# Patient Record
Sex: FEMALE | Race: WHITE
[De-identification: names, ages, dates, MRNs, and addresses within clinical notes are randomized per-mention and may not be internally consistent; named-entity substitution may affect disease eponyms.]

---

## 2017-07-05 ENCOUNTER — HOSPITAL ENCOUNTER (OUTPATIENT)
Dept: HOSPITAL 17 - NEPC | Age: 42
Setting detail: OBSERVATION
LOS: 1 days | Discharge: HOME | End: 2017-07-06
Attending: INTERNAL MEDICINE | Admitting: INTERNAL MEDICINE
Payer: COMMERCIAL

## 2017-07-05 VITALS — WEIGHT: 194.01 LBS | BODY MASS INDEX: 32.32 KG/M2 | HEIGHT: 65 IN

## 2017-07-05 VITALS
DIASTOLIC BLOOD PRESSURE: 72 MMHG | RESPIRATION RATE: 16 BRPM | OXYGEN SATURATION: 97 % | HEART RATE: 77 BPM | SYSTOLIC BLOOD PRESSURE: 114 MMHG

## 2017-07-05 VITALS
RESPIRATION RATE: 16 BRPM | DIASTOLIC BLOOD PRESSURE: 89 MMHG | TEMPERATURE: 98.6 F | OXYGEN SATURATION: 98 % | HEART RATE: 112 BPM | SYSTOLIC BLOOD PRESSURE: 163 MMHG

## 2017-07-05 VITALS — OXYGEN SATURATION: 97 %

## 2017-07-05 DIAGNOSIS — R07.89: Primary | ICD-10-CM

## 2017-07-05 DIAGNOSIS — M94.0: ICD-10-CM

## 2017-07-05 LAB
ANION GAP SERPL CALC-SCNC: 6 MEQ/L (ref 5–15)
BASOPHILS # BLD AUTO: 0.1 TH/MM3 (ref 0–0.2)
BASOPHILS NFR BLD: 0.6 % (ref 0–2)
BUN SERPL-MCNC: 13 MG/DL (ref 7–18)
CHLORIDE SERPL-SCNC: 105 MEQ/L (ref 98–107)
CK SERPL-CCNC: 58 U/L (ref 26–192)
EOSINOPHIL # BLD: 0.1 TH/MM3 (ref 0–0.4)
EOSINOPHIL NFR BLD: 1 % (ref 0–4)
ERYTHROCYTE [DISTWIDTH] IN BLOOD BY AUTOMATED COUNT: 12.7 % (ref 11.6–17.2)
GFR SERPLBLD BASED ON 1.73 SQ M-ARVRAT: 90 ML/MIN (ref 89–?)
HCO3 BLD-SCNC: 27.1 MEQ/L (ref 21–32)
HCT VFR BLD CALC: 36.4 % (ref 35–46)
HEMO FLAGS: (no result)
LYMPHOCYTES # BLD AUTO: 2 TH/MM3 (ref 1–4.8)
LYMPHOCYTES NFR BLD AUTO: 24.4 % (ref 9–44)
MCH RBC QN AUTO: 29.7 PG (ref 27–34)
MCHC RBC AUTO-ENTMCNC: 33.8 % (ref 32–36)
MCV RBC AUTO: 87.7 FL (ref 80–100)
MONOCYTES NFR BLD: 7.6 % (ref 0–8)
NEUTROPHILS # BLD AUTO: 5.5 TH/MM3 (ref 1.8–7.7)
NEUTROPHILS NFR BLD AUTO: 66.4 % (ref 16–70)
PLATELET # BLD: 239 TH/MM3 (ref 150–450)
POTASSIUM SERPL-SCNC: 4 MEQ/L (ref 3.5–5.1)
RBC # BLD AUTO: 4.15 MIL/MM3 (ref 4–5.3)
SODIUM SERPL-SCNC: 138 MEQ/L (ref 136–145)
WBC # BLD AUTO: 8.3 TH/MM3 (ref 4–11)

## 2017-07-05 PROCEDURE — G0378 HOSPITAL OBSERVATION PER HR: HCPCS

## 2017-07-05 PROCEDURE — 80048 BASIC METABOLIC PNL TOTAL CA: CPT

## 2017-07-05 PROCEDURE — 96375 TX/PRO/DX INJ NEW DRUG ADDON: CPT

## 2017-07-05 PROCEDURE — 99285 EMERGENCY DEPT VISIT HI MDM: CPT

## 2017-07-05 PROCEDURE — 96374 THER/PROPH/DIAG INJ IV PUSH: CPT

## 2017-07-05 PROCEDURE — 82550 ASSAY OF CK (CPK): CPT

## 2017-07-05 PROCEDURE — 71275 CT ANGIOGRAPHY CHEST: CPT

## 2017-07-05 PROCEDURE — 93005 ELECTROCARDIOGRAM TRACING: CPT

## 2017-07-05 PROCEDURE — 84703 CHORIONIC GONADOTROPIN ASSAY: CPT

## 2017-07-05 PROCEDURE — 84484 ASSAY OF TROPONIN QUANT: CPT

## 2017-07-05 PROCEDURE — 71010: CPT

## 2017-07-05 PROCEDURE — 85025 COMPLETE CBC W/AUTO DIFF WBC: CPT

## 2017-07-05 PROCEDURE — 93017 CV STRESS TEST TRACING ONLY: CPT

## 2017-07-05 PROCEDURE — C9113 INJ PANTOPRAZOLE SODIUM, VIA: HCPCS

## 2017-07-05 NOTE — PD
HPI


Chief Complaint:  Chest Pain


Time Seen by Provider:  23:34


Travel History


International Travel<30 days:  No


Contact w/Intl Traveler<30days:  No


Traveled to known affect area:  No





History of Present Illness


HPI


This is a 41-year-old female with nonicteric number control and has no other 

cardiovascular risk factors and has a history of GERD presents with a complaint 

of midsternal chest pain.  He states this feels different from her pain as she 

is experienced with GERD in the past.  She complains of associated 

lightheadedness along with the chest pain and the chest pain is noted to be 

intermittent and it started while she was at rest.  She denies fever chills 

cough nausea vomiting sweating.





PFSH


Past Medical History


Diminished Hearing:  No


Migraines:  Yes


Pregnant?:  Not Pregnant


:  2


Para:  2





Past Surgical History


 Section:  Yes


Other Surgery:  Yes (BREAST SURGERY)





Social History


Alcohol Use:  Yes (OCC)


Tobacco Use:  No


Substance Use:  No





Allergies-Medications


(Allergen,Severity, Reaction):  


Coded Allergies:  


     Keflex (Verified  Allergy, Severe, throat closes, 17)


Reported Meds & Prescriptions





Reported Meds & Active Scripts


Active


No Active Prescriptions or Reported Medications    








Review of Systems


General / Constitutional:  No: Fever, Chills, Weight Gain, Weight Loss, Other


Eyes:  No: Diploplia, Blurred Vision, Photophobia, Drainage, Redness, Foreign 

Body Sensation, Pain, Tearing, Blind Spots, Visual changes, Blindness, Other


HENT:  No: Headaches, Vertigo, Lightheadedness, Sore Throat, Rhinitis, 

Rhinorrhea, Congestion, Nosebleed, Neck Stiffness, Neck Pain, Masses, Gingival 

Bleeding, Dental Difficulties, Ear Discharge, Earache, Other


Cardiovascular:  Positive: Chest Pain or Discomfort, Other (lightheadedness ),  

No: Palpitations, Irregular Rhythm, Tachycardia, Diaphoresis, Syncope, Dyspnea 

on exertion, Varicosities, Edema, Cyanosis, Varicosities, Phlebitis, 

Claudication


Respiratory:  No: Cough, Shortness of Breath, Wheezing, Sneezing, Orthopnea, 

Hemoptysis, Stridor, Night Sweats, Pleuritic Pain, Other


Gastrointestinal:  No: Nausea, Vomiting, Diarrhea, Abdominal Pain, Hematemesis, 

Hematochezia, Constipation, Changes in Bowel Habits, Indigestion, Dysphagia, 

Loss of Appetite, Other


Genitourinary:  No: Urgency, Frequency, Dysuria, Nocturia, Hematuria, Decreased 

Urinary Output, Oliguria, Hesitancy, Dribbling, Incontinence, Pelvic Pain, 

Flank Pain, Dyspareunia, Discharge, Dysmenorrhea, Menorrhagia, Metorrhagia, 

Vaginal Bleeding, Other


Musculoskeletal:  No: Myalgias, Arthralgias, Limited ROM, Weakness, Cramping, 

Edema, Pain, Atrophy, Other


Skin:  No Rash, No Itching, No Dryness, No Lumps, No Hives, No Change in 

Pigmentation, No Change in nails, No Alopecia, No Lesions, No Breast Lumps, No 

Breast Tenderness, No Breast Swelling, No Other


Neurologic:  No: Weakness, Dizziness, Syncope, Focal Abnormalities, 

Coordination Problem, Tremor, Ataxia, Headache, Change in Mentation, Slurred 

Speech, Paresthesia, Incontinence, Seizures, Sensory Disturbance, Other


Psychiatric:  No: Anxiety, Depression, Suicidal Ideations, Disorder of Thought, 

Mood Disorder, Substance Abuse, Homicidal Ideation, Other


Endocrine:  No: Heat Intolerance, Cold Intolerance, Polyuria, Polydipsia, Other


Hematologic/Lymphatic:  No: Easy Bruising, Lymph Node Enlargement, Other





Physical Exam


Exam Limitations:  Poor Historian


Narrative


GENERAL: This is a 42-year-old female in mild distress


SKIN: Focused skin assessment warm/dry.no lesions no cyanosis no erythema 


HEAD: Atraumatic. Normocephalic. 


EYES: Pupils equal and round and reactive . No scleral icterus. No injection or 

drainage. 


ENT: No nasal bleeding or discharge.  Mucous membranes pink and moist.


NECK: Trachea midline. No JVD. 


CARDIOVASCULAR: S1-S2 appreciated.  Regular rate and rhythm.  No murmur 

appreciated.  Pulses normal throughout.  There is no reproducible chest pain on 

exam


RESPIRATORY: No accessory muscle use. Clear to auscultation. Breath sounds 

equal bilaterally. 


GASTROINTESTINAL: Abdomen soft, non-tender, nondistended. Hepatic and splenic 

margins not palpable.  Bowel sounds normal.  No peritoneal signs.


MUSCULOSKELETAL: No obvious deformities. No clubbing.  No cyanosis.  No edema. 


NEUROLOGICAL: Awake and alert and oriented 3.. No obvious cranial nerve 

deficits.  Motor and sensory exam grossly within normal limits. Normal speech.  

No meningeal signs.


PSYCHIATRIC: Appropriate mood and affect; insight and judgment normal.  No 

suicidal or homicidal ideation.





Data


Data


Last Documented VS





Vital Signs








  Date Time  Temp Pulse Resp B/P Pulse Ox O2 Delivery O2 Flow Rate FiO2


 


17 23:15  77 16 114/72 97 Room Air  


 


17 20:50 98.6       








Orders





 Electrocardiogram (17 21:19)


Complete Blood Count With Diff (17 21:19)


Basic Metabolic Panel (Bmp) (17 21:19)


Ckmb (Isoenzyme) Profile (17 21:19)


Troponin I (17 21:19)


Chest, Single Ap (17 21:19)


Iv Access Insert/Monitor (17 21:19)


Ecg Monitoring (17 21:19)


Oxygen Administration (17:)


Oximetry (17 21:)


Pantoprazole Inj (Protonix Inj) (17 00:00)


Ondansetron Inj (Zofran Inj) (17 00:00)


Ed Urine Pregnancytest Poc (17 23:47)


Aspirin (Aspirin) (17 00:00)


Ct Pulmonary Angiogram (17 )


Iohexol 350 Inj (Omnipaque 350 Inj) (17 00:42)


Admit Order (Ed Use Only) (17 01:29)


Activity Bed Rest With Brp (17 01:29)


Vital Signs (Adult) Q4H (17 01:29)


Cardiac Rhythm .As Directed (17 01:29)


Notify Dr: Other .PRN (17 01:29)


Notify  Parameters (17 01:29)


Resp Oxygen Nasal Cannula (17 )


Ckmb (Isoenzyme) Profile (17 01:42)


Ckmb (Isoenzyme) Profile (17 04:42)


Troponin I (17 01:42)


Troponin I (17 04:42)


Electrocardiogram (17 01:29)


Electrocardiogram (17 04:29)


^ Obtain (17 01:29)


Sodium Chloride 0.9% Flush (Ns Flush) (17 01:30)


Sodium Chloride 0.9% Flush (Ns Flush) (17 09:00)


Cardiac Monitor / Telemetry DELMA.Q8H (17 01:29)





Labs





 Laboratory Tests








Test 17





 21:42


 


White Blood Count 8.3 TH/MM3


 


Red Blood Count 4.15 MIL/MM3


 


Hemoglobin 12.3 GM/DL


 


Hematocrit 36.4 %


 


Mean Corpuscular Volume 87.7 FL


 


Mean Corpuscular Hemoglobin 29.7 PG


 


Mean Corpuscular Hemoglobin 33.8 %





Concent 


 


Red Cell Distribution Width 12.7 %


 


Platelet Count 239 TH/MM3


 


Mean Platelet Volume 8.2 FL


 


Neutrophils (%) (Auto) 66.4 %


 


Lymphocytes (%) (Auto) 24.4 %


 


Monocytes (%) (Auto) 7.6 %


 


Eosinophils (%) (Auto) 1.0 %


 


Basophils (%) (Auto) 0.6 %


 


Neutrophils # (Auto) 5.5 TH/MM3


 


Lymphocytes # (Auto) 2.0 TH/MM3


 


Monocytes # (Auto) 0.6 TH/MM3


 


Eosinophils # (Auto) 0.1 TH/MM3


 


Basophils # (Auto) 0.1 TH/MM3


 


CBC Comment DIFF FINAL 


 


Differential Comment  


 


Sodium Level 138 MEQ/L


 


Potassium Level 4.0 MEQ/L


 


Chloride Level 105 MEQ/L


 


Carbon Dioxide Level 27.1 MEQ/L


 


Anion Gap 6 MEQ/L


 


Blood Urea Nitrogen 13 MG/DL


 


Creatinine 0.71 MG/DL


 


Estimat Glomerular Filtration 90 ML/MIN





Rate 


 


Random Glucose 81 MG/DL


 


Calcium Level 8.8 MG/DL


 


Total Creatine Kinase 58 U/L


 


Troponin I LESS THAN 0.02





 NG/ML











MDM


Medical Decision Making


Medical Screen Exam Complete:  Yes


Emergency Medical Condition:  Yes


Medical Record Reviewed:  Yes


Interpretation(s)


EKG shows normal sinus rhythm borderline LVH no acute ST changes


Chest  x-ray is negative for acute disease


CTA  of the chest and inhaled the chest is negative for PE


Differential Diagnosis


This is a 41-year-old female patient presents with a complaint of midsternal 

chest pain with associated lightheadedness started at rest EKG is negative for 

ischemia upon internal and negative using given pro times and Zofran and states 

she feels better the patient notes that this chest pain is different from the 

pain she experienced in the past with her GERD patient has no reproducible 

chest or abdominal discomfort on exam





Sided to send patient to the chest pain center for serial cardiac enzymes and 

assessment for the need for stress testing.  Vitals are stable





Diagnosis





 Primary Impression:  


 Chest pain





Admitting Information


Admitting Physician Requests:  Observation


Scripts


No Active Prescriptions or Reported Meds


Condition:  Stable








Sadie Smith MD 2017 23:39

## 2017-07-05 NOTE — RADRPT
EXAM DATE/TIME:  07/05/2017 21:54 

 

[facility] COMPARISON:     

No previous studies available for comparison.

 

                     

INDICATIONS :     

Patient states they have chest tightness and pressure. 

                     

 

MEDICAL HISTORY :     

None.          

 

SURGICAL HISTORY :     

None.   

 

ENCOUNTER:     

Initial                                        

 

ACUITY:     

3 days      

 

PAIN SCORE:     

2/10

 

LOCATION:      

chest 

 

FINDINGS:     

A single view of the chest demonstrates the lungs to be symmetrically aerated without evidence of mas
s, infiltrate or effusion.  The cardiomediastinal contours are unremarkable.  Osseous structures are 
intact.

 

CONCLUSION:     No acute disease.  

 

 

 

 Aaron Jj MD on July 05, 2017 at 22:03           

Board Certified Radiologist.

 This report was verified electronically.

## 2017-07-06 VITALS
TEMPERATURE: 98.2 F | RESPIRATION RATE: 16 BRPM | DIASTOLIC BLOOD PRESSURE: 55 MMHG | SYSTOLIC BLOOD PRESSURE: 107 MMHG | HEART RATE: 78 BPM | OXYGEN SATURATION: 97 %

## 2017-07-06 VITALS
DIASTOLIC BLOOD PRESSURE: 54 MMHG | TEMPERATURE: 97.7 F | HEART RATE: 74 BPM | SYSTOLIC BLOOD PRESSURE: 112 MMHG | OXYGEN SATURATION: 98 % | RESPIRATION RATE: 20 BRPM

## 2017-07-06 VITALS
SYSTOLIC BLOOD PRESSURE: 108 MMHG | OXYGEN SATURATION: 98 % | DIASTOLIC BLOOD PRESSURE: 56 MMHG | HEART RATE: 95 BPM | TEMPERATURE: 98.1 F | RESPIRATION RATE: 16 BRPM

## 2017-07-06 VITALS — HEART RATE: 69 BPM

## 2017-07-06 VITALS — HEART RATE: 76 BPM

## 2017-07-06 VITALS — HEART RATE: 85 BPM

## 2017-07-06 VITALS — OXYGEN SATURATION: 97 %

## 2017-07-06 LAB
CK SERPL-CCNC: 37 U/L (ref 26–192)
CK SERPL-CCNC: 51 U/L (ref 26–192)

## 2017-07-06 RX ADMIN — Medication SCH ML: at 08:24

## 2017-07-06 RX ADMIN — Medication SCH ML: at 08:33

## 2017-07-06 NOTE — HHI.HP
HPI


Primary Care Physician


Roxanne Esteves MD





Past Family Social History


Allergies:  


Coded Allergies:  


     Keflex (Verified  Allergy, Severe, throat closes, 7/5/17)


Reported Medications





Reported Meds & Active Scripts


Active


No Active Prescriptions or Reported Medications


Active Ordered Medications





 Current Medications








 Medications


  (Trade)  Dose


 Ordered  Sig/Traci


 Route  Start Time


 Stop Time Status Last Admin


 


  (NS Flush)  2 ml  UNSCH  PRN


 IV FLUSH  7/6/17 01:30


     


 


 


  (NS Flush)  2 ml  BID


 IV FLUSH  7/6/17 09:00


    7/6/17 08:33


 











Physical Exam


Vital Signs





 Vital Signs








  Date Time  Temp Pulse Resp B/P Pulse Ox O2 Delivery O2 Flow Rate FiO2


 


7/6/17 07:40 98.2 78 16 107/55 97   


 


7/6/17 05:23  76      


 


7/6/17 03:45 97.7 74 20 112/54 98   


 


7/6/17 02:13     97   21


 


7/5/17 23:15  77 16 114/72 97 Room Air  


 


7/5/17 23:14     97 Room Air  


 


7/5/17 20:50 98.6 112 16 163/89 98 Room Air  








Laboratory





Laboratory Tests








Test 7/5/17 7/6/17 7/6/17





 21:42 02:17 04:43


 


White Blood Count 8.3   


 


Red Blood Count 4.15   


 


Hemoglobin 12.3   


 


Hematocrit 36.4   


 


Mean Corpuscular Volume 87.7   


 


Mean Corpuscular Hemoglobin 29.7   


 


Mean Corpuscular Hemoglobin 33.8   





Concent   


 


Red Cell Distribution Width 12.7   


 


Platelet Count 239   


 


Mean Platelet Volume 8.2   


 


Neutrophils (%) (Auto) 66.4   


 


Lymphocytes (%) (Auto) 24.4   


 


Monocytes (%) (Auto) 7.6   


 


Eosinophils (%) (Auto) 1.0   


 


Basophils (%) (Auto) 0.6   


 


Neutrophils # (Auto) 5.5   


 


Lymphocytes # (Auto) 2.0   


 


Monocytes # (Auto) 0.6   


 


Eosinophils # (Auto) 0.1   


 


Basophils # (Auto) 0.1   


 


CBC Comment DIFF FINAL   


 


Differential Comment    


 


Sodium Level 138   


 


Potassium Level 4.0   


 


Chloride Level 105   


 


Carbon Dioxide Level 27.1   


 


Anion Gap 6   


 


Blood Urea Nitrogen 13   


 


Creatinine 0.71   


 


Estimat Glomerular Filtration 90   





Rate   


 


Random Glucose 81   


 


Calcium Level 8.8   


 


Total Creatine Kinase 58  51  37 


 


Troponin I LESS THAN 0.02  LESS THAN 0.02  LESS THAN 0.02 








Result Diagram:  


7/5/17 2142 7/5/17 2142








Assessment and Plan


Assessment and Plan


Patient seen and examined with NP.  History and physical as recorded.


Atypical CP of about a week duration.  Protracted but does come and go.  No 

other associated sx although some light headedness and nausea yesterday.  This 

is non-exertional pain. 


PE:   Chest wall tenderness RUQ duplicates discomfort she has been having


      RSR no GRM


      Abd without MGR


A:   Atypical CP probably secondary to costo-condritis


      RO ACS


P:   If neg evaluation disch to FU with her PCP Enmanuel Munguia MD Jul 6, 2017 10:40

## 2017-07-06 NOTE — HHI.DCPOC
Discharge Care Plan


Diagnosis:  


(1) Musculoskeletal chest pain


Goals to Promote Your Health


* To prevent worsening of your condition and complications


* To maintain your health at the optimal level


Directions to Meet Your Goals


*** Take your medications as prescribed


*** Follow your dietary instruction


*** Follow activity as directed








*** Keep your appointments as scheduled


*** Take your immunizations and boosters as scheduled


*** If your symptoms worsen call your PCP, if no PCP go to Urgent Care Center 

or Emergency Room***


*** Smoking is Dangerous to Your Health. Avoid second hand smoke***


***Call the 24-hour hour crisis hotline for domestic abuse at 1-336.532.8530***








Darcy Vargas Jul 6, 2017 12:27

## 2017-07-06 NOTE — HHI.HP
HPI


Primary Care Physician


Roxanne Esteves MD


Chief Complaint


Chest pain


History of Present Illness


41-year-old female with history of migraines presents emergency room for 

further evaluation of chest pain.  Onset last week.  Location substernal.  

Characterized as chest tightness, pressure, and "someone sitting on my chest."  

Nonexertional component.  Pain usually occurs daily midday lasting 

approximately 1 hour.  No radiation of pain.  No associated symptoms of nausea, 

shortness of breath, or diaphoresis.    No particular movement makes pain 

better or worse.  Breathing does not make pain better or worse. Yesterday, 

after lunch, she became lightheaded and nauseous.  Yesterday's episode of 

lightheadedness accompanied with one week of intermittent chest tightness 

patient decided to come the emergency room for further evaluation of chest 

pain.  Denies any similar pain in the past.





Review of Systems


General: No fatigue,weakness, fever, chills, recent illness, or change in 

appetite.  Has been in her general state of health, in fact she has recently 

increased her activity level.


HEENT: No HA, no vision changes, no nasal congestion or drainage, no dysphasia.

  History of migraine. Generally has migraine monthly around time of menses. 

Migraines vary in intensity and duration from hours to 2 days.


CV: As stated above.  Denies any current chest pain or pressure.  No 

palpitations or dizziness.


RESP: No SOB, cough, wheeze, recent URI, or history of asthma


GI: No current nausea otherwise as stated above.  No vomiting, bowel changes, 

diarrhea, constipation, or pain.  No unintentional weight gain or weight loss.


: No dysuria, urgency, frequency,or history of kidney stones


GYN: Last menses 10 days ago.  Denies chance of pregnancy.


EXT: No lower leg edema, no paraesthesias


MS: No discomfort or change in ROM


NEURO: No difficulty with balance, LOC, motor/sensory deficits


PSYCH: No anxiety, depression, or situational stress.  In fact states she 

recently was promoted and now her job is less stressful.  Works in internal 

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


SKIN: No rashes, no concerning lesions





Past Family Social History


Allergies:  


Coded Allergies:  


     Keflex (Verified  Allergy, Severe, throat closes, 17)


Past Medical History


Migraines


Past Surgical History


Breast augmentation, 


Reported Medications


Active


No Active Prescriptions or Reported Medications    


Will use Aleve when necessary as needed for migraines


Active Ordered Medications





 Current Medications








 Medications


  (Trade)  Dose


 Ordered  Sig/Traci


 Route  Start Time


 Stop Time Status Last Admin


 


  (NS Flush)  2 ml  UNSCH  PRN


 IV FLUSH  17 01:30


     


 


 


  (NS Flush)  2 ml  BID


 IV FLUSH  17 09:00


    17 08:33


 








Family History


Noncontributory for early onset cardiovascular disease


Social History


No known diabetes, hypertension, hyperlipidemia.


Lifelong nonsmoker.  Denies any alcohol or illegal drug use.


Endorses an active lifestyle and has recently increased her daily activity.


Works as a [employer].  Single.  2 children ages 15 

and 5.





Past cardiac testing


None





Physical Exam


Vital Signs





 Vital Signs








  Date Time  Temp Pulse Resp B/P Pulse Ox O2 Delivery O2 Flow Rate FiO2


 


17 07:40 98.2 78 16 107/55 97   


 


17 05:23  76      


 


17 03:45 97.7 74 20 112/54 98   


 


17 02:13     97   21


 


17 23:15  77 16 114/72 97 Room Air  


 


17 23:14     97 Room Air  


 


17 20:50 98.6 112 16 163/89 98 Room Air  








Physical Exam


GENERAL: Alert WN, WD, NAD, pleasant, mildly obese,  female


HEAD: NC, AT


EYES: Sclera clear, conjunctiva without injection, pupils equal and round


ENT: Mucous membranes pink and moist, no nasal discharge or bleeding


NECK: Supple, no masses, trachea midline


CV: RRR, without murmur, rub, gallop, no JVD, S1-S2 no S3-S4.  No carotid or 

femoral bruits.


RESP: Clear lungs throughout bilateral, no crackles, wheeze, rhonchi, 

symmetrical chest rise, nonlabored, able to speak in full sentences


ABD: Soft, NT, ND, no masses, positive bowel tones, obese


EXT: Pulses +24, no dependent edema


MS: Chest pain reproducible upon palpation stating same pain that brought her 

to the ER.  Normal tone 4 extremities,no obvious deformities, full range of 

motion


NEURO: CN II through CN XII grossly intact, motor strength 5/5, gait WNL


PSYCH: A+O 3, pleasant affect, appropriate speech, appropriate mood and affect

, insight and judgment


SKIN: Normal turgor, normal texture, no lesions, no rashes, brisk cap refill, 

even hair distribution


Laboratory





Laboratory Tests








Test 17





 21:42 02:17 04:43


 


White Blood Count 8.3   


 


Red Blood Count 4.15   


 


Hemoglobin 12.3   


 


Hematocrit 36.4   


 


Mean Corpuscular Volume 87.7   


 


Mean Corpuscular Hemoglobin 29.7   


 


Mean Corpuscular Hemoglobin 33.8   





Concent   


 


Red Cell Distribution Width 12.7   


 


Platelet Count 239   


 


Mean Platelet Volume 8.2   


 


Neutrophils (%) (Auto) 66.4   


 


Lymphocytes (%) (Auto) 24.4   


 


Monocytes (%) (Auto) 7.6   


 


Eosinophils (%) (Auto) 1.0   


 


Basophils (%) (Auto) 0.6   


 


Neutrophils # (Auto) 5.5   


 


Lymphocytes # (Auto) 2.0   


 


Monocytes # (Auto) 0.6   


 


Eosinophils # (Auto) 0.1   


 


Basophils # (Auto) 0.1   


 


CBC Comment DIFF FINAL   


 


Differential Comment    


 


Sodium Level 138   


 


Potassium Level 4.0   


 


Chloride Level 105   


 


Carbon Dioxide Level 27.1   


 


Anion Gap 6   


 


Blood Urea Nitrogen 13   


 


Creatinine 0.71   


 


Estimat Glomerular Filtration 90   





Rate   


 


Random Glucose 81   


 


Calcium Level 8.8   


 


Total Creatine Kinase 58  51  37 


 


Troponin I LESS THAN 0.02  LESS THAN 0.02  LESS THAN 0.02 








Result Diagram:  


17





Imaging





Last Impressions








CT Angiography 17 0000 Signed





Impressions: 





 Service Date/Time:   00:29 - CONCLUSION:  1. No evidence 





 for pulmonary embolism.  2. No infiltrate. 3. Nonspecific low density in the 





 right lobe of liver. This is likely benign however outpatient contrasted MRI 





 recommended.     Jonathan Coto MD 


 


Chest X-Ray 17 Signed





Impressions: 





 Service Date/Time:  2017 21:54 - CONCLUSION: No acute 





 disease.       Aaron Jj MD 








Course


EKGs


Normal sinus rhythm, normal axis, no st t segment changes





Assessment and Plan


Assessment and Plan


Atypical chest pain-admitted to chest pain center.  Ruled out with 3 sets of 

EKGs, cardiac enzymes, and monitor overnight.  Seen and evaluated by Dr. Enmanuel De La O.  Chest pain atypical and most likely musculoskeletal in nature.  Will 

proceed with exercise stress test.


Costochondritis-encouraged over-the-counter naproxen, 2 tablets, 2 days to 

take with food.  May use heating pad to affected area.  Follow-up with PCP if 

pain persists.








Darcy Vargas 2017 10:37

## 2017-07-06 NOTE — RADRPT
EXAM DATE/TIME:  2017 00:29 

 

[facility] COMPARISON:     

No previous studies available for comparison.

 

 

INDICATIONS :     

Chest tightness with pressure past 5 days.

                      

 

IV CONTRAST:     

65 cc Omnipaque 350 (iohexol) IV 

 

 

RADIATION DOSE:     

15.69 CTDIvol (mGy) 

 

 

MEDICAL HISTORY :     

None  

 

SURGICAL HISTORY :      

Hysterectomy.  section.

 

ENCOUNTER:      

Initial

 

ACUITY:      

4 - 6 days

 

PAIN SCALE:      

6/10

 

LOCATION:       

Bilateral chest 

 

TECHNIQUE:     

Volumetric scanning of the chest was performed using a pulmonary embolism protocol MIP images were re
constructed.  Using automated exposure control and adjustment of the mA and/or kV according to patien
t size, radiation dose was kept as low as reasonably achievable to obtain optimal diagnostic quality 
images.   DICOM format image data is available electronically for review and comparison.  

 

FINDINGS:     

 

PULMONARY ARTERIES:

No filling defects are seen in the pulmonary arteries through the segmental level.

 

LUNGS:     

There is no consolidation or pneumothorax .  No concerning pulmonary nodule is visualized.

 

PLEURAE:     

There is no pleural thickening or pleural effusion.

 

MEDIASTINUM:     

There is good visualization of the great vessels of the middle mediastinum.  No evidence of mediastin
al or hilar adenopathy/mass.

 

MUSCULOSKELETAL:     

Within normal limits for patient age.

 

MISCELLANEOUS:     

The visualized upper abdominal organs demonstrate 5.2 cm area of low attenuation in the liver, right 
lobe.

 

CONCLUSION:     

1. No evidence for pulmonary embolism. 

2. No infiltrate.

3. Nonspecific low density in the right lobe of liver. This is likely benign however outpatient contr
asted MRI recommended.

 

 

 

 Jonathan Coto MD on 2017 at 0:43           

Board Certified Radiologist.

 This report was verified electronically.

## 2017-07-07 NOTE — EKG
Date Performed: 07/06/2017       Time Performed: 02:17:47

 

PTAGE:      41 years

 

EKG:      Sinus rhythm 

 

 NORMAL ECG 

 

NO PREVIOUS TRACING            

 

DOCTOR:   Sb Acevedo  Interpretating Date/Time  07/07/2017 13:08:12

## 2017-07-07 NOTE — TR
Date Performed: 07/06/2017       Time Performed: 11:28:26

 

DOCTOR:      Sb Acevedo 

 

DRUG LIST:     

CLINICAL HISTORY:     

REASON FOR TEST:     

REASON FOR ENDING:     

OBSERVATION:     

CONCLUSION:      Norm protocol completed. Stopped sec to leg fatigue and exceeding target heart rate
. Maximum HR=160 Target HR Achieved=90.0% Maximum GC=923/70 Total Exercise Time=5:02. No reprod chest
 discomfort. No ectopy. No st t segment changes to sugg ischemia. Normal bp response. Recovery quick 
and unremarkable.

COMMENTS:      Patient exercised using the Norm protocol. No electrocardiographic changes were seen 
to suggest ischemia. Hemodynamic response to exercise was normal. No significant arrhythmia was prese
nt.

## 2017-07-07 NOTE — EKG
Date Performed: 07/06/2017       Time Performed: 03:56:13

 

PTAGE:      41 years

 

EKG:      Sinus rhythm 

 

 NORMAL ECG

 

PREVIOUS TRACING       : 04/27/2016 12.46 Since previous tracing, no significant change noted

 

DOCTOR:   Sb Acevedo  Interpretating Date/Time  07/07/2017 13:08:06

## 2017-07-07 NOTE — EKG
Date Performed: 07/05/2017       Time Performed: 21:26:14

 

PTAGE:      41 years

 

EKG:      Sinus rhythm 

 

 NORMAL ECG 

 

NO PREVIOUS TRACING            

 

DOCTOR:   Sb Acevedo  Interpretating Date/Time  07/07/2017 13:09:58

## 2017-11-21 ENCOUNTER — HOSPITAL ENCOUNTER (EMERGENCY)
Dept: HOSPITAL 17 - PHED | Age: 42
Discharge: HOME | End: 2017-11-21
Payer: COMMERCIAL

## 2017-11-21 VITALS
OXYGEN SATURATION: 97 % | SYSTOLIC BLOOD PRESSURE: 133 MMHG | TEMPERATURE: 98.3 F | HEART RATE: 120 BPM | RESPIRATION RATE: 16 BRPM | DIASTOLIC BLOOD PRESSURE: 81 MMHG

## 2017-11-21 VITALS
SYSTOLIC BLOOD PRESSURE: 118 MMHG | RESPIRATION RATE: 17 BRPM | HEART RATE: 89 BPM | DIASTOLIC BLOOD PRESSURE: 66 MMHG | OXYGEN SATURATION: 98 %

## 2017-11-21 VITALS — HEIGHT: 65 IN | BODY MASS INDEX: 32.51 KG/M2 | WEIGHT: 195.11 LBS

## 2017-11-21 DIAGNOSIS — K52.9: Primary | ICD-10-CM

## 2017-11-21 LAB
ALP SERPL-CCNC: 41 U/L (ref 45–117)
ALT SERPL-CCNC: 29 U/L (ref 10–53)
ANION GAP SERPL CALC-SCNC: 11 MEQ/L (ref 5–15)
AST SERPL-CCNC: 21 U/L (ref 15–37)
BASOPHILS # BLD AUTO: 0.2 TH/MM3 (ref 0–0.2)
BASOPHILS NFR BLD: 1.8 % (ref 0–2)
BILIRUB SERPL-MCNC: 0.5 MG/DL (ref 0.2–1)
BUN SERPL-MCNC: 15 MG/DL (ref 7–18)
CHLORIDE SERPL-SCNC: 107 MEQ/L (ref 98–107)
COLOR UR: YELLOW
COMMENT (UR): (no result)
CULTURE IF INDICATED: (no result)
EOSINOPHIL # BLD: 0 TH/MM3 (ref 0–0.4)
EOSINOPHIL NFR BLD: 0.2 % (ref 0–4)
ERYTHROCYTE [DISTWIDTH] IN BLOOD BY AUTOMATED COUNT: 12.7 % (ref 11.6–17.2)
GFR SERPLBLD BASED ON 1.73 SQ M-ARVRAT: 69 ML/MIN (ref 89–?)
GLUCOSE UR STRIP-MCNC: (no result) MG/DL
HCO3 BLD-SCNC: 18.8 MEQ/L (ref 21–32)
HCT VFR BLD CALC: 41.5 % (ref 35–46)
HEMO FLAGS: (no result)
HGB UR QL STRIP: (no result)
KETONES UR STRIP-MCNC: (no result) MG/DL
LEUKOCYTE ESTERASE UR QL STRIP: (no result) /HPF (ref 0–5)
LYMPHOCYTES # BLD AUTO: 0.9 TH/MM3 (ref 1–4.8)
LYMPHOCYTES NFR BLD AUTO: 8.7 % (ref 9–44)
MCH RBC QN AUTO: 28.1 PG (ref 27–34)
MCHC RBC AUTO-ENTMCNC: 32.5 % (ref 32–36)
MCV RBC AUTO: 86.3 FL (ref 80–100)
METHOD OF COLLECTION: (no result)
MONOCYTES NFR BLD: 6.6 % (ref 0–8)
NEUTROPHILS # BLD AUTO: 8.1 TH/MM3 (ref 1.8–7.7)
NEUTROPHILS NFR BLD AUTO: 82.7 % (ref 16–70)
NITRITE UR QL STRIP: (no result)
PLATELET # BLD: 176 TH/MM3 (ref 150–450)
POTASSIUM SERPL-SCNC: 3.9 MEQ/L (ref 3.5–5.1)
RBC # BLD AUTO: 4.81 MIL/MM3 (ref 4–5.3)
SODIUM SERPL-SCNC: 137 MEQ/L (ref 136–145)
SP GR UR STRIP: 1.03 (ref 1–1.03)
SQUAMOUS #/AREA URNS HPF: (no result) /HPF (ref 0–5)
WBC # BLD AUTO: 9.9 TH/MM3 (ref 4–11)

## 2017-11-21 PROCEDURE — 80053 COMPREHEN METABOLIC PANEL: CPT

## 2017-11-21 PROCEDURE — 81001 URINALYSIS AUTO W/SCOPE: CPT

## 2017-11-21 PROCEDURE — 85025 COMPLETE CBC W/AUTO DIFF WBC: CPT

## 2017-11-21 PROCEDURE — 99284 EMERGENCY DEPT VISIT MOD MDM: CPT

## 2017-11-21 PROCEDURE — 96374 THER/PROPH/DIAG INJ IV PUSH: CPT

## 2017-11-21 PROCEDURE — 96361 HYDRATE IV INFUSION ADD-ON: CPT

## 2017-11-21 NOTE — PD
HPI


Chief Complaint:  GI Complaint


Time Seen by Provider:  10:10


Travel History


International Travel<30 days:  No


Contact w/Intl Traveler<30days:  No


Traveled to known affect area:  No





History of Present Illness


HPI


42-year-old female says she been sick since .  She says she's had 

persistent vomiting and diarrhea.  She is not having much abdominal pain.  She 

says there is no chance of Reglan seemed.  He has no history of abdominal 

surgery except for .  There has not been any recent travel.  No one 

else at home is consistent.  She has had some chills.  She is not aware of fever





PFSH


Past Medical History


Cardiovascular Problems:  No


Diminished Hearing:  No


Migraines:  Yes


Pregnant?:  Not Pregnant


LMP:  2 weeks ago


:  2


Para:  2





Past Surgical History


 Section:  Yes


Other Surgery:  Yes (BREAST SURGERY)





Social History


Alcohol Use:  Yes (OCC)


Tobacco Use:  No


Substance Use:  No





Allergies-Medications


(Allergen,Severity, Reaction):  


Coded Allergies:  


     cephalexin (Unverified  Allergy, Severe, throat closes, 17)


Reported Meds & Prescriptions





Reported Meds & Active Scripts


Active


No Active Prescriptions or Reported Medications    








Review of Systems


General / Constitutional:  Positive: Chills, No: Fever


Eyes:  No: Diploplia


HENT:  No: Headaches, Vertigo


Cardiovascular:  No: Chest Pain or Discomfort, Palpitations


Respiratory:  No: Cough, Shortness of Breath


Gastrointestinal:  Positive: Nausea, Vomiting, Diarrhea


Genitourinary:  No: Frequency, Dysuria


Skin:  No Rash


Neurologic:  Positive: Weakness





Physical Exam


Narrative


GENERAL: Well-developed female


SKIN: Focused skin assessment warm/dry.


HEAD: Atraumatic. Normocephalic. 


EYES: Pupils equal and round. No scleral icterus. No injection or drainage. 


ENT: No nasal bleeding or discharge.  Mucous membranes dry


NECK: Trachea midline. No JVD. 


CARDIOVASCULAR: Regular rate and rhythm.  No murmur appreciated.


RESPIRATORY: No accessory muscle use. Clear to auscultation. Breath sounds 

equal bilaterally. 


GASTROINTESTINAL: Abdomen soft, non-tender, nondistended. Hepatic and splenic 

margins not palpable. 


MUSCULOSKELETAL: No obvious deformities. No clubbing.  No cyanosis.  No edema. 


NEUROLOGICAL: Awake and alert. No obvious cranial nerve deficits.  Motor 

grossly within normal limits. Normal speech.


PSYCHIATRIC: Appropriate mood and affect; insight and judgment normal.





Data


Data


Last Documented VS





Vital Signs








  Date Time  Temp Pulse Resp B/P (MAP) Pulse Ox O2 Delivery O2 Flow Rate FiO2


 


17 12:11  89 17 118/66 (83) 98 Room Air  


 


17 10:01 98.3       








Orders





 Orders


Complete Blood Count With Diff (17 10:14)


Comprehensive Metabolic Panel (17 10:14)


Urinalysis - C+S If Indicated (17 10:14)


Sodium Chlor 0.9% 1000 Ml Inj (Ns 1000 M (17 10:15)


Sodium Chlor 0.9% 1000 Ml Inj (Ns 1000 M (17 10:15)


Ondansetron Inj (Zofran Inj) (17 10:15)





Labs





Laboratory Tests








Test


  17


10:45 17


11:30


 


White Blood Count 9.9 TH/MM3  


 


Red Blood Count 4.81 MIL/MM3  


 


Hemoglobin 13.5 GM/DL  


 


Hematocrit 41.5 %  


 


Mean Corpuscular Volume 86.3 FL  


 


Mean Corpuscular Hemoglobin 28.1 PG  


 


Mean Corpuscular Hemoglobin


Concent 32.5 % 


  


 


 


Red Cell Distribution Width 12.7 %  


 


Platelet Count 176 TH/MM3  


 


Mean Platelet Volume 9.0 FL  


 


Neutrophils (%) (Auto) 82.7 %  


 


Lymphocytes (%) (Auto) 8.7 %  


 


Monocytes (%) (Auto) 6.6 %  


 


Eosinophils (%) (Auto) 0.2 %  


 


Basophils (%) (Auto) 1.8 %  


 


Neutrophils # (Auto) 8.1 TH/MM3  


 


Lymphocytes # (Auto) 0.9 TH/MM3  


 


Monocytes # (Auto) 0.7 TH/MM3  


 


Eosinophils # (Auto) 0.0 TH/MM3  


 


Basophils # (Auto) 0.2 TH/MM3  


 


CBC Comment DIFF FINAL  


 


Differential Comment   


 


Blood Urea Nitrogen 15 MG/DL  


 


Creatinine 0.89 MG/DL  


 


Random Glucose 110 MG/DL  


 


Total Protein 8.2 GM/DL  


 


Albumin 4.3 GM/DL  


 


Calcium Level 8.7 MG/DL  


 


Alkaline Phosphatase 41 U/L  


 


Aspartate Amino Transf


(AST/SGOT) 21 U/L 


  


 


 


Alanine Aminotransferase


(ALT/SGPT) 29 U/L 


  


 


 


Total Bilirubin 0.5 MG/DL  


 


Sodium Level 137 MEQ/L  


 


Potassium Level 3.9 MEQ/L  


 


Chloride Level 107 MEQ/L  


 


Carbon Dioxide Level 18.8 MEQ/L  


 


Anion Gap 11 MEQ/L  


 


Estimat Glomerular Filtration


Rate 69 ML/MIN 


  


 


 


Urine Collection Type  CLEAN CATCH 


 


Urine Color  YELLOW 


 


Urine Turbidity  CLEAR 


 


Urine pH  6.0 


 


Urine Specific Gravity  1.032 


 


Urine Protein  100 mg/dL 


 


Urine Glucose (UA)  NEG mg/dL 


 


Urine Ketones  NEG mg/dL 


 


Urine Occult Blood  NEG 


 


Urine Nitrite  NEG 


 


Urine Bilirubin  NEG 


 


Urine Leukocyte Esterase  NEG 


 


Urine RBC   /hpf 


 


Urine WBC  0-2 /hpf 


 


Urine Squamous Epithelial


Cells 


  0-5 /hpf 


 


 


Microscopic Urinalysis Comment


  


  CULT NOT


INDICATED


 


Urine Collection Time  11:30 











[facility]


Medical Decision Making


Medical Screen Exam Complete:  Yes


Emergency Medical Condition:  Yes


Medical Record Reviewed:  Yes


Differential Diagnosis


Differential includes viral syndrome, gastroenteritis, food poisoning, 

dehydration


Narrative Course


Patient given some IV fluids and Zofran and reports feeling a bit better.  Lab 

work is unremarkable.  Impression is acute gastroenteritis.  She'll be released 

with prescription for Zofran





Diagnosis





 Primary Impression:  


 Acute gastroenteritis


Scripts


Ondansetron Odt (Zofran Odt) 4 Mg Tab


4 MG SL Q8HR Y for Nausea/Vomiting, #10 TAB 0 Refills


   Prov: Grover Corbett MD         17


Disposition:  01 DISCHARGE HOME


Condition:  Stable











Grover Corbett MD 2017 10:29

## 2018-04-09 ENCOUNTER — HOSPITAL ENCOUNTER (EMERGENCY)
Dept: HOSPITAL 17 - NEPD | Age: 43
Discharge: HOME | End: 2018-04-09
Payer: COMMERCIAL

## 2018-04-09 VITALS — HEIGHT: 65 IN | BODY MASS INDEX: 33.39 KG/M2 | WEIGHT: 200.42 LBS

## 2018-04-09 VITALS
SYSTOLIC BLOOD PRESSURE: 140 MMHG | RESPIRATION RATE: 18 BRPM | DIASTOLIC BLOOD PRESSURE: 69 MMHG | OXYGEN SATURATION: 100 % | HEART RATE: 84 BPM

## 2018-04-09 VITALS
TEMPERATURE: 98.2 F | RESPIRATION RATE: 18 BRPM | SYSTOLIC BLOOD PRESSURE: 124 MMHG | HEART RATE: 91 BPM | OXYGEN SATURATION: 99 % | DIASTOLIC BLOOD PRESSURE: 83 MMHG

## 2018-04-09 DIAGNOSIS — R06.02: ICD-10-CM

## 2018-04-09 DIAGNOSIS — R07.89: Primary | ICD-10-CM

## 2018-04-09 LAB
ALBUMIN SERPL-MCNC: 4 GM/DL (ref 3.4–5)
ALP SERPL-CCNC: 43 U/L (ref 45–117)
ALT SERPL-CCNC: 47 U/L (ref 10–53)
AST SERPL-CCNC: 23 U/L (ref 15–37)
BASOPHILS # BLD AUTO: 0 TH/MM3 (ref 0–0.2)
BASOPHILS NFR BLD: 0.6 % (ref 0–2)
BILIRUB INDIRECT SERPL-MCNC: 0.2 MG/DL (ref 0–0.8)
BILIRUB SERPL-MCNC: 0.3 MG/DL (ref 0.2–1)
BUN SERPL-MCNC: 13 MG/DL (ref 7–18)
CALCIUM SERPL-MCNC: 8.5 MG/DL (ref 8.5–10.1)
CHLORIDE SERPL-SCNC: 105 MEQ/L (ref 98–107)
CREAT SERPL-MCNC: 0.74 MG/DL (ref 0.5–1)
DIRECT BILIRUBIN ADULT: 0.1 MG/DL (ref 0–0.2)
EOSINOPHIL # BLD: 0.1 TH/MM3 (ref 0–0.4)
EOSINOPHIL NFR BLD: 0.9 % (ref 0–4)
ERYTHROCYTE [DISTWIDTH] IN BLOOD BY AUTOMATED COUNT: 13 % (ref 11.6–17.2)
GFR SERPLBLD BASED ON 1.73 SQ M-ARVRAT: 86 ML/MIN (ref 89–?)
GLUCOSE SERPL-MCNC: 93 MG/DL (ref 74–106)
HCO3 BLD-SCNC: 22.7 MEQ/L (ref 21–32)
HCT VFR BLD CALC: 36 % (ref 35–46)
HGB BLD-MCNC: 12.3 GM/DL (ref 11.6–15.3)
INR PPP: 1.1 RATIO
LYMPHOCYTES # BLD AUTO: 1.3 TH/MM3 (ref 1–4.8)
LYMPHOCYTES NFR BLD AUTO: 22.3 % (ref 9–44)
MAGNESIUM SERPL-MCNC: 2 MG/DL (ref 1.5–2.5)
MCH RBC QN AUTO: 29.7 PG (ref 27–34)
MCHC RBC AUTO-ENTMCNC: 34.1 % (ref 32–36)
MCV RBC AUTO: 87.2 FL (ref 80–100)
MONOCYTE #: 0.4 TH/MM3 (ref 0–0.9)
MONOCYTES NFR BLD: 6.5 % (ref 0–8)
NEUTROPHILS # BLD AUTO: 4 TH/MM3 (ref 1.8–7.7)
NEUTROPHILS NFR BLD AUTO: 69.7 % (ref 16–70)
PLATELET # BLD: 231 TH/MM3 (ref 150–450)
PMV BLD AUTO: 8.2 FL (ref 7–11)
PROT SERPL-MCNC: 7.7 GM/DL (ref 6.4–8.2)
PROTHROMBIN TIME: 10.8 SEC (ref 9.8–11.6)
RBC # BLD AUTO: 4.13 MIL/MM3 (ref 4–5.3)
SODIUM SERPL-SCNC: 140 MEQ/L (ref 136–145)
TROPONIN I SERPL-MCNC: (no result) NG/ML (ref 0.02–0.05)
WBC # BLD AUTO: 5.8 TH/MM3 (ref 4–11)

## 2018-04-09 PROCEDURE — 71275 CT ANGIOGRAPHY CHEST: CPT

## 2018-04-09 PROCEDURE — 80076 HEPATIC FUNCTION PANEL: CPT

## 2018-04-09 PROCEDURE — 84703 CHORIONIC GONADOTROPIN ASSAY: CPT

## 2018-04-09 PROCEDURE — 83735 ASSAY OF MAGNESIUM: CPT

## 2018-04-09 PROCEDURE — 85730 THROMBOPLASTIN TIME PARTIAL: CPT

## 2018-04-09 PROCEDURE — 84484 ASSAY OF TROPONIN QUANT: CPT

## 2018-04-09 PROCEDURE — 83690 ASSAY OF LIPASE: CPT

## 2018-04-09 PROCEDURE — 85379 FIBRIN DEGRADATION QUANT: CPT

## 2018-04-09 PROCEDURE — 93005 ELECTROCARDIOGRAM TRACING: CPT

## 2018-04-09 PROCEDURE — 85610 PROTHROMBIN TIME: CPT

## 2018-04-09 PROCEDURE — 82550 ASSAY OF CK (CPK): CPT

## 2018-04-09 PROCEDURE — 99285 EMERGENCY DEPT VISIT HI MDM: CPT

## 2018-04-09 PROCEDURE — 71046 X-RAY EXAM CHEST 2 VIEWS: CPT

## 2018-04-09 PROCEDURE — 85025 COMPLETE CBC W/AUTO DIFF WBC: CPT

## 2018-04-09 PROCEDURE — 80048 BASIC METABOLIC PNL TOTAL CA: CPT

## 2018-04-09 NOTE — PD
HPI


Chief Complaint:  Respiratory Symptoms


Time Seen by Provider:  15:26


Travel History


International Travel<30 days:  No


Contact w/Intl Traveler<30days:  No


Traveled to known affect area:  No





History of Present Illness


HPI


Patient is a 42-year-old female presenting to the emergency department for 

evaluation of chest pressure and shortness of breath.  Patient states Saturday 

night she was driving when the pain started.  The pain is localized to her 

right anterior chest wall, there is radiation to the upper back.  She reports 

her pain is a 2 out of 10, worse with deep breathing.  Patient also reports a 

cough, nasal congestion.  She denies any fever, chills, nausea, vomiting, 

abdominal pain, headache.  Patient denies any significant past medical history 

other than migraines.  The patient denies any family history of early heart 

disease.  Patient has never been a tobacco user.  Symptom onset was sudden, 

symptoms are waxing and waning.





PFSH


Past Medical History


Cardiovascular Problems:  No


Diminished Hearing:  No


Migraines:  Yes


Pregnant?:  Not Pregnant


:  2


Para:  2





Past Surgical History


 Section:  Yes (x 2)


Other Surgery:  Yes (BREAST AUGMENTATION)





Social History


Alcohol Use:  Yes (rare)


Tobacco Use:  No


Substance Use:  No





Allergies-Medications


(Allergen,Severity, Reaction):  


Coded Allergies:  


     cephalexin (Unverified  Allergy, Severe, throat closes, 17)


Reported Meds & Prescriptions





Reported Meds & Active Scripts


Active


Zofran Odt (Ondansetron Odt) 4 Mg Tab 4 Mg SL Q8HR PRN








Review of Systems


Except as stated in HPI:  all other systems reviewed are Neg


General / Constitutional:  No: Fever, Chills


Eyes:  No: Blurred Vision


HENT:  No: Headaches, Lightheadedness


Cardiovascular:  Positive: Chest Pain or Discomfort


Respiratory:  Positive: Cough, Shortness of Breath, Pleuritic Pain


Gastrointestinal:  No: Nausea, Abdominal Pain


Musculoskeletal:  No: Myalgias





Physical Exam


Narrative


GENERAL: Overweight, well-developed, alert  female.  Presenting in no 

acute distress.


SKIN: Warm and dry.


HEAD: Atraumatic. Normocephalic. 


EYES: Pupils equal and round. No scleral icterus. No injection or drainage. 


ENT: No nasal bleeding or discharge.  Mucous membranes pink and moist.


NECK: Trachea midline. No JVD. 


CARDIOVASCULAR: Regular rate and rhythm.  


RESPIRATORY: No accessory muscle use. Clear to auscultation. Breath sounds 

equal bilaterally. 


GASTROINTESTINAL: Abdomen soft, non-tender, nondistended. Hepatic and splenic 

margins not palpable. 


MUSCULOSKELETAL: Extremities without clubbing, cyanosis, or edema. No obvious 

deformities. 


NEUROLOGICAL: Awake and alert. No obvious cranial nerve deficits.  Motor 

grossly within normal limits. Five out of 5 muscle strength in the arms and 

legs.  Normal speech.


PSYCHIATRIC: Appropriate mood and affect; insight and judgment normal.





Data


Data


Last Documented VS





Vital Signs








  Date Time  Temp Pulse Resp B/P (MAP) Pulse Ox O2 Delivery O2 Flow Rate FiO2


 


18 15:40  84 18 140/69 (92) 100 Room Air  


 


18 12:56 98.2       








Orders





 Orders


Electrocardiogram (18 12:59)


Complete Blood Count With Diff (18 12:59)


Act Partial Throm Time (Ptt) (18 12:59)


Prothrombin Time / Inr (Pt) (18 12:59)


Chest, Pa & Lat (18 12:59)


Hepatic Functional Panel (18 15:33)


Lipase (18 15:33)


D-Dimer (18 15:33)


Ecg Monitoring (18 15:33)


Iv Access Insert/Monitor (18 15:33)


Oximetry (18 15:33)


Ct Pulmonary Angiogram (18 )


Ed Urine Pregnancytest Poc (18 16:17)


Basic Metabolic Panel (Bmp) (18 15:40)


Ckmb (Isoenzyme) Profile (18 15:40)


Magnesium (Mg) (18 15:40)


Troponin I (18 15:40)


Iohexol 350 Inj (Omnipaque 350 Inj) (18 12:18)





Labs





Laboratory Tests








Test


  18


15:40 18


16:22


 


Prothrombin Time 10.8 SEC  


 


Prothromb Time International


Ratio 1.1 RATIO 


  


 


 


Activated Partial


Thromboplast Time 24.5 SEC 


  


 


 


D-Dimer Quantitative (PE/DVT) 0.73 MG/L FEU  


 


Blood Urea Nitrogen 13 MG/DL  


 


Creatinine 0.74 MG/DL  


 


Random Glucose 93 MG/DL  


 


Total Protein 7.7 GM/DL  


 


Albumin 4.0 GM/DL  


 


Calcium Level 8.5 MG/DL  


 


Magnesium Level 2.0 MG/DL  


 


Alkaline Phosphatase 43 U/L  


 


Aspartate Amino Transf


(AST/SGOT) 23 U/L 


  


 


 


Alanine Aminotransferase


(ALT/SGPT) 47 U/L 


  


 


 


Total Bilirubin 0.3 MG/DL  


 


Direct Bilirubin 0.1 MG/DL  


 


Sodium Level 140 MEQ/L  


 


Potassium Level 3.8 MEQ/L  


 


Chloride Level 105 MEQ/L  


 


Carbon Dioxide Level 22.7 MEQ/L  


 


Anion Gap 12 MEQ/L  


 


Estimat Glomerular Filtration


Rate 86 ML/MIN 


  


 


 


Indirect Bilirubin 0.2 MG/DL  


 


Total Creatine Kinase 61 U/L  


 


Troponin I


  LESS THAN 0.02


NG/ML 


 


 


Lipase 98 U/L  


 


White Blood Count  5.8 TH/MM3 


 


Red Blood Count  4.13 MIL/MM3 


 


Hemoglobin  12.3 GM/DL 


 


Hematocrit  36.0 % 


 


Mean Corpuscular Volume  87.2 FL 


 


Mean Corpuscular Hemoglobin  29.7 PG 


 


Mean Corpuscular Hemoglobin


Concent 


  34.1 % 


 


 


Red Cell Distribution Width  13.0 % 


 


Platelet Count  231 TH/MM3 


 


Mean Platelet Volume  8.2 FL 


 


Neutrophils (%) (Auto)  69.7 % 


 


Lymphocytes (%) (Auto)  22.3 % 


 


Monocytes (%) (Auto)  6.5 % 


 


Eosinophils (%) (Auto)  0.9 % 


 


Basophils (%) (Auto)  0.6 % 


 


Neutrophils # (Auto)  4.0 TH/MM3 


 


Lymphocytes # (Auto)  1.3 TH/MM3 


 


Monocytes # (Auto)  0.4 TH/MM3 


 


Eosinophils # (Auto)  0.1 TH/MM3 


 


Basophils # (Auto)  0.0 TH/MM3 


 


CBC Comment  DIFF FINAL 


 


Differential Comment   











MDM


Medical Decision Making


Medical Screen Exam Complete:  Yes


Emergency Medical Condition:  Yes


Interpretation(s)





Last Impressions








Chest X-Ray 18 1259 Signed





Impressions: 





 Service Date/Time:  2018 13:24 - CONCLUSION:  1. No acute 





 cardiopulmonary disease.     Delvin Baxter MD 








Laboratory Tests








Test


  18


15:40 18


16:22


 


Prothrombin Time 10.8 SEC  


 


Prothromb Time International


Ratio 1.1 RATIO 


  


 


 


Activated Partial


Thromboplast Time 24.5 SEC 


  


 


 


D-Dimer Quantitative (PE/DVT) 0.73 MG/L FEU  


 


Blood Urea Nitrogen 13 MG/DL  


 


Creatinine 0.74 MG/DL  


 


Random Glucose 93 MG/DL  


 


Total Protein 7.7 GM/DL  


 


Albumin 4.0 GM/DL  


 


Calcium Level 8.5 MG/DL  


 


Magnesium Level 2.0 MG/DL  


 


Alkaline Phosphatase 43 U/L  


 


Aspartate Amino Transf


(AST/SGOT) 23 U/L 


  


 


 


Alanine Aminotransferase


(ALT/SGPT) 47 U/L 


  


 


 


Total Bilirubin 0.3 MG/DL  


 


Direct Bilirubin 0.1 MG/DL  


 


Sodium Level 140 MEQ/L  


 


Potassium Level 3.8 MEQ/L  


 


Chloride Level 105 MEQ/L  


 


Carbon Dioxide Level 22.7 MEQ/L  


 


Anion Gap 12 MEQ/L  


 


Estimat Glomerular Filtration


Rate 86 ML/MIN 


  


 


 


Indirect Bilirubin 0.2 MG/DL  


 


Total Creatine Kinase 61 U/L  


 


Troponin I


  LESS THAN 0.02


NG/ML 


 


 


Lipase 98 U/L  


 


White Blood Count  5.8 TH/MM3 


 


Red Blood Count  4.13 MIL/MM3 


 


Hemoglobin  12.3 GM/DL 


 


Hematocrit  36.0 % 


 


Mean Corpuscular Volume  87.2 FL 


 


Mean Corpuscular Hemoglobin  29.7 PG 


 


Mean Corpuscular Hemoglobin


Concent 


  34.1 % 


 


 


Red Cell Distribution Width  13.0 % 


 


Platelet Count  231 TH/MM3 


 


Mean Platelet Volume  8.2 FL 


 


Neutrophils (%) (Auto)  69.7 % 


 


Lymphocytes (%) (Auto)  22.3 % 


 


Monocytes (%) (Auto)  6.5 % 


 


Eosinophils (%) (Auto)  0.9 % 


 


Basophils (%) (Auto)  0.6 % 


 


Neutrophils # (Auto)  4.0 TH/MM3 


 


Lymphocytes # (Auto)  1.3 TH/MM3 


 


Monocytes # (Auto)  0.4 TH/MM3 


 


Eosinophils # (Auto)  0.1 TH/MM3 


 


Basophils # (Auto)  0.0 TH/MM3 


 


CBC Comment  DIFF FINAL 


 


Differential Comment   








Vital Signs








  Date Time  Temp Pulse Resp B/P (MAP) Pulse Ox O2 Delivery O2 Flow Rate FiO2


 


18 15:40  84 18 140/69 (92) 100 Room Air  


 


18 12:56 98.2 91 18 124/83 (97) 99   








Differential Diagnosis


ACS versus [place] versus bronchitis versus pneumonia versus anxiety versus 

pulmonary embolism versus other


Narrative Course


Patient is well-appearing 42-year-old female presenting for evaluation of chest 

pain and shortness of breath that started Saturday.  Patient's vital signs are 

stable, labs and imaging ordered and pending.  Chest x-ray shows no acute 

disease.  .CBC, chemistry, coags reviewed, no acute finding identified.  D-

dimer is elevated at 0.73.  CT pulmonary angiogram ordered and pending.


CT pulmonary angiogram is negative for pulmonary embolism.  It does show a 2.2 

cm thyroid nodule.


Findings and plan of care was discussed with my attending physician.  Patient 

will be discharged home with strict return precautions.


Discussed findings with patient.  Patient is comfortable being discharged home.

  She reports that she was worried about a blood clot more than her heart.  She 

feels reassured that the findings were negative.  Patient was given strict 

return precautions and is agreeable to plan.  Patient stable for discharge.





Diagnosis





 Primary Impression:  


 Atypical chest pain


Referrals:  


Primary Care Physician


3 days


Patient Instructions:  Chest Pain (ED), General Instructions





***Additional Instructions:  


Follow-up with your primary doctor


Return to emergency department immediately for any new or worsening symptoms


***Med/Other Pt SpecificInfo:  No Change to Meds


Disposition:  01 DISCHARGE HOME


Condition:  Stable











Iva Maciel 2018 16:17

## 2018-04-09 NOTE — EKG
Date Performed: 04/09/2018       Time Performed: 15:25:49

 

PTAGE:      42 years

 

EKG:      Sinus rhythm 

 

 NORMAL ECG No significant change from prior electrocardiogram. 

 

 PREVIOUS TRACING            : 07/06/2017 03.56

 

DOCTOR:   Stalin Roth  Interpretating Date/Time  04/09/2018 20:12:46

## 2018-04-09 NOTE — RADRPT
EXAM DATE/TIME:  04/09/2018 18:01 

 

[facility] COMPARISON:     

No previous studies available for comparison.

 

 

INDICATIONS :     

Right sided chest pain with shortness of breath.

                      

 

IV CONTRAST:     

70 cc Omnipaque 350 (iohexol) IV 

 

 

RADIATION DOSE:     

10.47 CTDIvol (mGy) 

 

 

MEDICAL HISTORY :     

None  

 

SURGICAL HISTORY :      

None. 

 

ENCOUNTER:      

Initial

 

ACUITY:      

1 day

 

PAIN SCALE:      

3/10

 

LOCATION:       

Right chest 

 

TECHNIQUE:     

Volumetric scanning of the chest was performed using a pulmonary embolism protocol MIP images were re
constructed.  Using automated exposure control and adjustment of the mA and/or kV according to patien
t size, radiation dose was kept as low as reasonably achievable to obtain optimal diagnostic quality 
images.   DICOM format image data is available electronically for review and comparison.  

 

Follow-up recommendations for detected pulmonary nodules are based at a minimum on nodule size and pa
tient risk factors according to Fleischner Society Guidelines.

 

FINDINGS:     

 

PULMONARY ARTERIES:

No filling defects are seen in the pulmonary arteries through the segmental level.

 

LUNGS:     

There is no consolidation or pneumothorax .  No concerning pulmonary nodule is visualized.

 

PLEURAE:     

There is no pleural thickening or pleural effusion.

 

MEDIASTINUM:     

There is good visualization of the great vessels of the middle mediastinum.  No evidence of mediastin
al or hilar adenopathy/mass.

 

MUSCULOSKELETAL:     

Within normal limits for patient age.

 

MISCELLANEOUS:     

The visualized upper abdominal organs demonstrate no acute abnormality. 2.2 cm right thyroid nodule.

 

CONCLUSION:     

1. Negative for pulmonary embolus. 2.2 cm right thyroid nodule or cyst.

 

 

 

 

 Aaron Jj MD on April 09, 2018 at 18:12           

Board Certified Radiologist.

 This report was verified electronically.

## 2018-04-09 NOTE — RADRPT
EXAM DATE/TIME:  2018 13:24 

 

[facility] COMPARISON:     

No previous studies available for comparison.

 

                     

INDICATIONS :     

Right side chest pain down to hip.

                     

 

MEDICAL HISTORY :     

None.          

 

SURGICAL HISTORY :     

Hysterectomy.  section.  

 

ENCOUNTER:     

Initial                                        

 

ACUITY:     

2 days      

 

PAIN SCORE:     

3/10

 

LOCATION:     

Right chest 

 

FINDINGS:     

PA and lateral views of the chest demonstrate the lungs to be symmetrically aerated without evidence 
of mass, infiltrate or effusion.  The cardiomediastinal contours are unremarkable.  Osseous structure
s are intact.

 

CONCLUSION:     

1. No acute cardiopulmonary disease.

 

 

 

 Delvin Baxter MD on 2018 at 13:56           

Board Certified Radiologist.

 This report was verified electronically.

## 2018-04-30 ENCOUNTER — HOSPITAL ENCOUNTER (OUTPATIENT)
Dept: HOSPITAL 17 - HRAD | Age: 43
Discharge: HOME | End: 2018-04-30
Attending: FAMILY MEDICINE
Payer: COMMERCIAL

## 2018-04-30 VITALS
TEMPERATURE: 98.4 F | DIASTOLIC BLOOD PRESSURE: 73 MMHG | OXYGEN SATURATION: 97 % | HEART RATE: 85 BPM | SYSTOLIC BLOOD PRESSURE: 135 MMHG | RESPIRATION RATE: 20 BRPM

## 2018-04-30 VITALS
HEART RATE: 98 BPM | SYSTOLIC BLOOD PRESSURE: 159 MMHG | OXYGEN SATURATION: 100 % | RESPIRATION RATE: 16 BRPM | DIASTOLIC BLOOD PRESSURE: 97 MMHG | TEMPERATURE: 98.2 F

## 2018-04-30 VITALS
SYSTOLIC BLOOD PRESSURE: 122 MMHG | RESPIRATION RATE: 18 BRPM | HEART RATE: 89 BPM | DIASTOLIC BLOOD PRESSURE: 56 MMHG | OXYGEN SATURATION: 98 %

## 2018-04-30 DIAGNOSIS — E04.1: Primary | ICD-10-CM

## 2018-04-30 PROCEDURE — 88172 CYTP DX EVAL FNA 1ST EA SITE: CPT

## 2018-04-30 PROCEDURE — 88173 CYTOPATH EVAL FNA REPORT: CPT

## 2018-04-30 PROCEDURE — 10022: CPT

## 2018-04-30 PROCEDURE — 76942 ECHO GUIDE FOR BIOPSY: CPT

## 2018-04-30 NOTE — RADRPT
EXAM DATE/TIME:  2018 12:43 

 

[facility] COMPARISON:     

 

EXTERNAL COMPARISON: 

CT PULMONARY ANGIOGRAM, 2018, 18:01.  CHEST PA & LAT, 2018, 13:24.  [place] I
maging, Ultrasound Thyroid, 2018.

 

 

INDICATIONS :      

Right thyroid nodule.

                     

                     

 

 

 

MEDICAL HISTORY :        

Right thyroid nodule.

 

SURGICAL HISTORY :     

 section.   Breast augmentation.

 

ENCOUNTER:     

Initial

 

ACUITY:     

4 - 6 days

 

PAIN SCORE:     

0/10

 

LOCATION:     

Right neck

                     

 

ORGAN:      

Right thyroid lobe 

 

SPECIMENS:      

Three fine needle aspirate(s) submitted for pathologic evaluation.

 

DEVICE:      

25 gauge needle

                     

Post procedure scanning reveals no hematoma or other complication.

 

The possibility does exist that the tissue obtained will be non-diagnostic.  If the sample is non-sharmila
gnostic a repeat

biopsy or surgical biopsy may need to be performed.  

 

TECHNIQUE:  

1.  Ultrasound guidance for needle biopsy.

2.  Needle biopsy.

 

The risks, benefits and alternatives to the procedure were explained and verbal and written consent w
as obtained.  The site was prepped in sterile fashion.  Full sterile technique was used, including ca
p, mask, sterile gloves and gown and a large sterile sheet.  Hand hygiene and 2% chlorhexidine and/or
 betadine/alcohol prep was utilized per protocol for cutaneous antisepsis.  The skin and subcutaneous
 tissues were infiltrated with local anesthetic solution.  Sterile gel and sterile probe cover were u
tilized for ultrasound guidance.

 

With the patient on the ultrasound table, images were obtained.  There are is a dominant cystic nodul
e in the right mid thyroid gland measuring 2.3 cm. It contains a thin septation and no solid componen
t. At the upper pole there is a 1.1 x 0.9 x 0.8 cm nodule that is partially cystic with a mural nodul
e. This lesion was targeted for biopsy.

 

A needle was advanced into the right upper lobe solid and cystic nodule and the number of specimens a
s above obtained and submitted for pathologic evaluation.

 

The patient tolerated the procedure well and left the ultrasound suite in stable condition. 

 

CONCLUSION:     

Uncomplicated ultrasound guided needle biopsy of a solid and cystic nodule in the right upper pole.  


 

 

 

 Domenico Reyes MD on 2018 at 13:58           

Board Certified Radiologist.

 This report was verified electronically.

## 2018-04-30 NOTE — PD.RAD
Post US Procedure Prog Note


Pre Procedure Diagnosis:  


(1) Thyroid nodule


Post Procedure Diagnosis:  


(1) Thyroid nodule


Procedure Date:


Apr 30, 2018


Supervising Radiologist:


Domenico Reyes


Proceduralist/Assist:  Yolie Yusuf RDMS


Estimated blood loss:


none


Plan of Activity


Patient to Unit:  [facility]


Patient Condition:  Good


See PACS Report for procedural detail/treatment





Biopsy


Imaging Guidance:  Ultrasound


Side:  Right


Biopsy Procedure:  Thyroid


Site:


Right upper pole.


Specimen:  Fine Needle Aspirate


Plan


to ropu then discharge in 30 minutes.











Domenico Reyes MD Apr 30, 2018 13:55

## 2018-06-10 ENCOUNTER — HOSPITAL ENCOUNTER (EMERGENCY)
Dept: HOSPITAL 17 - PHED | Age: 43
LOS: 1 days | Discharge: HOME | End: 2018-06-11
Payer: COMMERCIAL

## 2018-06-10 VITALS
HEART RATE: 117 BPM | TEMPERATURE: 98 F | OXYGEN SATURATION: 98 % | SYSTOLIC BLOOD PRESSURE: 175 MMHG | DIASTOLIC BLOOD PRESSURE: 81 MMHG | RESPIRATION RATE: 18 BRPM

## 2018-06-10 VITALS — BODY MASS INDEX: 36.36 KG/M2 | WEIGHT: 218.26 LBS | HEIGHT: 65 IN

## 2018-06-10 DIAGNOSIS — Z79.899: ICD-10-CM

## 2018-06-10 DIAGNOSIS — M54.31: Primary | ICD-10-CM

## 2018-06-10 DIAGNOSIS — Z88.1: ICD-10-CM

## 2018-06-10 PROCEDURE — 96372 THER/PROPH/DIAG INJ SC/IM: CPT

## 2018-06-10 PROCEDURE — 72131 CT LUMBAR SPINE W/O DYE: CPT

## 2018-06-10 PROCEDURE — 99283 EMERGENCY DEPT VISIT LOW MDM: CPT

## 2018-06-10 NOTE — PD
HPI


Chief Complaint:  Pain: Acute or Chronic


Time Seen by Provider:  23:18


Travel History


International Travel<30 days:  No


Contact w/Intl Traveler<30days:  No


Traveled to known affect area:  No





History of Present Illness


HPI


42-year-old female presents to the emergency department complaint of low back 

pain since yesterday evening when she twisted her ankle.  Patient states she 

did not fall to the ground had no impact.  Patient states she caught herself 

and prevented herself from falling but thinks that same time she may have 

strained her lower back.  Patient has pre-existing low back pain and follows 

with a chiropractor and acupuncturist for chronic low back pain.  Patient 

states that she has history of sciatica.  Patient states that her pain is quite 

severe when she does any type of rotational movement or attempts to get up and 

down from a chair or toilet or off of the floor.  Patient states when she is 

moving walking seems to help her symptoms are remaining still seems to help her 

symptoms but has quite a bit of severe pain when transitioning from remaining 

still or ambulating.  Patient has been taking Aleve without symptom relief.  In 

fact 2 Aleve around 6 or 7 PM prior to arrival to the emergency department; she 

took 3 Aleve tablets total today.  Patient does not report any bladder or bowel 

dysfunction or numbness tingling of the lower extremities but feels like both 

of her legs are slightly weak when she tries to rotate from soup although has 

no difficulty ambulating and has intact strength on ambulation.  Patient states 

she drove herself to the hospital because of persistent back pain.  Patient 

states this evening prior to starting to come to the hospital she had been 

doing exercises on the floor and as she attempted to get up off the floor she 

has severe pain and felt shaky on her legs.  Patient does not report any 

subsequent fall her legs giving way on her.  Patient states nonsteroidal anti-

inflammatories and provided no relief and movement or transitioning movement 

increases her pain.  Last menstrual period was 18 and normal for her and 

patient denies pregnancy.





Formerly [facility]


Past Medical History


*** Narrative Medical


Sciatica migraine  breast augmentation; occasional alcohol use; 

nursing notes reviewed


Cardiovascular Problems:  No


Diminished Hearing:  No


Musculoskeletal:  Yes (sciatica)


Immunizations Current:  Yes


Migraines:  Yes


Tetanus Vaccination:  Unknown


Influenza Vaccination:  Yes


Pregnant?:  Not Pregnant


LMP:  5-25-18


:  2


Para:  2





Past Surgical History


 Section:  Yes (x 2)


Other Surgery:  Yes (BREAST AUGMENTATION)





Social History


Alcohol Use:  Yes (rare)


Tobacco Use:  No


Substance Use:  No





Allergies-Medications


(Allergen,Severity, Reaction):  


Coded Allergies:  


     cephalexin (Unverified  Allergy, Severe, throat closes, 6/10/18)


Reported Meds & Prescriptions





Reported Meds & Active Scripts


Active


Reported


Maxalt (Rizatriptan Benzoate) 5 Mg Tab 1 Tab PO AS DIRECTED








Review of Systems


Except as stated in HPI:  all other systems reviewed are Neg


General / Constitutional:  No: Fever, Chills


HENT:  No: Congestion


Cardiovascular:  No: Chest Pain or Discomfort


Respiratory:  No: Shortness of Breath


Gastrointestinal:  No: Abdominal Pain, Constipation


Genitourinary:  No: Dysuria, Decreased Urinary Output, Incontinence


Musculoskeletal:  Positive: Pain (low back pain), No: Myalgias, Arthralgias


Neurologic:  Positive: Weakness, No: Dizziness, Syncope, Focal Abnormalities, 

Coordination Problem, Headache, Change in Mentation, Slurred Speech, Paresthesia

, Sensory Disturbance


Psychiatric:  No: Anxiety


Hematologic/Lymphatic:  No: Easy Bruising





Physical Exam


Narrative


GENERAL: Well-developed well-nourished female in no acute distress no 

respiratory distress however intermittently tearful


SKIN: Warm and dry.


HEAD: Normocephalic.


EYES: No scleral icterus. No injection or drainage. 


NECK: Supple, trachea midline. No JVD or lymphadenopathy.


CARDIOVASCULAR: Regular rate and rhythm without murmurs, gallops, or rubs. 


RESPIRATORY: Breath sounds equal bilaterally. No accessory muscle use.


GASTROINTESTINAL: Abdomen soft, non-tender, nondistended. 


MUSCULOSKELETAL: No cyanosis, or edema.  Low  to palpation.


BACK: Nontender without obvious deformity.  Sensory exam intact.  Motor 

strength 5/5.  Pain on lower extremity extension hip flexion.  No CVA 

tenderness.








Data


Data


Last Documented VS





Vital Signs








  Date Time  Temp Pulse Resp B/P (MAP) Pulse Ox O2 Delivery O2 Flow Rate FiO2


 


6/10/18 22:34 98.0 117 18 175/81 (112) 98   








Orders





 Orders


Dexamethasone Inj (Decadron Inj) (6/10/18 23:30)


Orphenadrine Inj (Norflex Inj) (6/10/18 23:30)


Ct Lumb Spine W/O Contrast (18 )


Ketorolac Inj (Toradol Inj) (18 01:30)








MDM


Medical Decision Making


Medical Screen Exam Complete:  Yes


Emergency Medical Condition:  Yes


Medical Record Reviewed:  Yes


Interpretation(s)





Last Impressions








Lumbar Spine CT 18 0000 Signed





Impressions: 





 CONCLUSION:





 1.  Chronic bilateral pars interarticularis defects of L5.





 2.  No other evidence of fracture.





 3.  Mild degenerative findings. Central canal diameter within normal limits at 





 all levels.





 4.  Scarring and punctate calcification of the left kidney





  





 








Vital Signs








  Date Time  Temp Pulse Resp B/P (MAP) Pulse Ox O2 Delivery O2 Flow Rate FiO2


 


6/10/18 22:34 98.0 117 18 175/81 (112) 98   








Differential Diagnosis


Sciatica, HNP, DDD, no findings for cauda equina


Narrative Course


Patient given injection of Norflex and Decadron as well as CT imaging study 

ordered


Patient notes some improvement of symptoms although pain is not completely 

relieved patient is aware of imaging results


Patient given Toradol 60 mg IM 1 injection


Patient will be discharged with prescription for prednisone taper as well as 

Robaxin and 6 doses of narcotic pain medication with recommendation for close 

with her primary care provider will be given work excuse 3 days and encouraged 

to follow conservative management during that time should she have any change 

or concerns she should return to the emergency department as she may require 

MRI in the future although no indication at this time for emergent MRI of the 

lumbar spine





Diagnosis





 Primary Impression:  


 Sciatica of right side associated with disorder of lumbosacral spine


Referrals:  


Primary Care Physician


1 day


Patient Instructions:  General Instructions


Departure Forms:  Tests/Procedures, Work Release   


   Special Instructions:  no work x 3 days





***Additional Instructions:  


Take medications as prescribed


Do not take nonsteroidal anti-inflammatory medication such as ibuprofen/Advil/

Motrin or Aleve/Naprosyn/naproxen while taking steroid taper


Follow-up with your primary care provider call office in a.m.


No work 2 days


Rest on firm surface and use moist heat for comfort after ice intermittently 

for first 1224 hrs.


Return the emergency department for any concerns or change in condition


Increase fluid hydration


***Med/Other Pt SpecificInfo:  Prescription(s) given


Scripts


Oxycodone-Acetaminophen (Percocet) 5-325 mg Tab


1 TAB PO Q6H Y for PAIN, #6 TAB 0 Refills


   Prov: Bharati Lopez MD         18 


Methocarbamol (Robaxin) 750 Mg Tab


750 MG PO Q6HR for Muscle Spasm, #12 TAB 0 Refills


   Prov: Bharati Lopez MD         18 


Methylprednisolone Dosepak (Medrol Dosepak) 4 Mg Dspk


4 MG PO AS DIRECTED, #1 DSPK 0 Refills


   Per Pharmacist direction


   Prov: Bharati Lopez MD         18


Disposition:  01 DISCHARGE HOME


Condition:  Stable











Bharati Lopez MD Gold 10, 2018 23:38

## 2018-06-11 VITALS — DIASTOLIC BLOOD PRESSURE: 86 MMHG | SYSTOLIC BLOOD PRESSURE: 158 MMHG

## 2018-06-11 NOTE — RADRPT
EXAM DATE:  2018 12:26 AM EDT

AGE/SEX:        42 years / Female



INDICATIONS:  Lower back pain without recent trauma.



CLINICAL DATA:  This is the patient's initial encounter. Patient reports that signs and symptoms have
 been present for 1 day and indicates a pain score of 4/10. 

                                                                          

MEDICAL/SURGICAL HISTORY:   . Sciatica.   section.



RADIATION DOSE:  40.17 CTDI (mGy)







COMPARISON:      No prior exams available for comparison. 









TECHNIQUE:  Contiguous axial images were acquired with a multirow detector CT scanner without contras
t.  Multiplanar reconstructions in the sagittal and coronal plane were also performed. Using automate
d exposure control and adjustment of the mA and/or kV according to patient size, radiation dose was k
ept as low as reasonably achievable to obtain optimal diagnostic quality images.



FINDINGS:  

Vertebrae:  Bilateral pars interarticularis defects of L5 with corticated margins suggesting a chroni
c finding. No other evidence of fracture.

Alignment:  Normal. No subluxation.



T12-L1:  The thecal sac has a normal diameter.  No evidence of disc bulge or protrusion.  The neural 
foramina are patent bilaterally.

L1-L2:  The thecal sac has a normal diameter.  No evidence of disc bulge or protrusion.  The neural f
oramina are patent bilaterally.

L2-L3:  The thecal sac has a normal diameter.  No evidence of disc bulge or protrusion.  The neural f
oramina are patent bilaterally.

L3-L4:  The thecal sac has a normal diameter.  No evidence of disc bulge or protrusion.  The neural f
oramina are patent bilaterally.

L4-L5:  Central disc protrusion. Central canal diameter within normal limits. Neural foraminal diamet
ers within normal limits.

L5-S1:  Bilateral pars interarticularis defects. Minimal grade 1 anterolisthesis. Mild bilateral neur
al foraminal narrowing. Central canal diameter within normal limits.





CONCLUSION:

1.  Chronic bilateral pars interarticularis defects of L5.

2.  No other evidence of fracture.

3.  Mild degenerative findings. Central canal diameter within normal limits at all levels.

4.  Scarring and punctate calcification of the left kidney



Electronically signed by: Stu Busch MD  2018 12:55 AM EDT